# Patient Record
Sex: MALE | Race: WHITE | Employment: UNEMPLOYED | URBAN - METROPOLITAN AREA
[De-identification: names, ages, dates, MRNs, and addresses within clinical notes are randomized per-mention and may not be internally consistent; named-entity substitution may affect disease eponyms.]

---

## 2021-08-14 ENCOUNTER — HOSPITAL ENCOUNTER (EMERGENCY)
Age: 21
Discharge: HOME | End: 2021-08-14
Attending: EMERGENCY MEDICINE
Payer: MEDICAID

## 2021-08-14 VITALS
TEMPERATURE: 97.7 F | BODY MASS INDEX: 19.22 KG/M2 | WEIGHT: 145 LBS | HEART RATE: 55 BPM | OXYGEN SATURATION: 97 % | SYSTOLIC BLOOD PRESSURE: 113 MMHG | DIASTOLIC BLOOD PRESSURE: 68 MMHG | RESPIRATION RATE: 18 BRPM | HEIGHT: 73 IN

## 2021-08-14 DIAGNOSIS — K02.9 PAIN DUE TO DENTAL CARIES: ICD-10-CM

## 2021-08-14 DIAGNOSIS — K08.89 PAIN, DENTAL: Primary | ICD-10-CM

## 2021-08-14 PROCEDURE — 6370000000 HC RX 637 (ALT 250 FOR IP): Performed by: STUDENT IN AN ORGANIZED HEALTH CARE EDUCATION/TRAINING PROGRAM

## 2021-08-14 PROCEDURE — 99283 EMERGENCY DEPT VISIT LOW MDM: CPT

## 2021-08-14 RX ORDER — IBUPROFEN 800 MG/1
800 TABLET ORAL ONCE
Status: COMPLETED | OUTPATIENT
Start: 2021-08-14 | End: 2021-08-14

## 2021-08-14 RX ORDER — PENICILLIN V POTASSIUM 250 MG/1
500 TABLET ORAL ONCE
Status: COMPLETED | OUTPATIENT
Start: 2021-08-14 | End: 2021-08-14

## 2021-08-14 RX ORDER — PENICILLIN V POTASSIUM 500 MG/1
500 TABLET ORAL 4 TIMES DAILY
Qty: 28 TABLET | Refills: 0 | Status: SHIPPED | OUTPATIENT
Start: 2021-08-14 | End: 2021-08-21

## 2021-08-14 RX ORDER — ACETAMINOPHEN 325 MG/1
650 TABLET ORAL ONCE
Status: COMPLETED | OUTPATIENT
Start: 2021-08-14 | End: 2021-08-14

## 2021-08-14 RX ORDER — KETOROLAC TROMETHAMINE 30 MG/ML
30 INJECTION, SOLUTION INTRAMUSCULAR; INTRAVENOUS ONCE
Status: DISCONTINUED | OUTPATIENT
Start: 2021-08-14 | End: 2021-08-14

## 2021-08-14 RX ORDER — IBUPROFEN 800 MG/1
800 TABLET ORAL EVERY 6 HOURS PRN
Qty: 20 TABLET | Refills: 0 | Status: SHIPPED | OUTPATIENT
Start: 2021-08-14 | End: 2021-08-19

## 2021-08-14 RX ADMIN — ACETAMINOPHEN 650 MG: 325 TABLET ORAL at 01:41

## 2021-08-14 RX ADMIN — IBUPROFEN 800 MG: 800 TABLET, FILM COATED ORAL at 01:42

## 2021-08-14 RX ADMIN — BENZOCAINE: 220 GEL, DENTIFRICE DENTAL at 01:42

## 2021-08-14 RX ADMIN — PENICILLIN V POTASSIUM 500 MG: 250 TABLET ORAL at 01:42

## 2021-08-14 ASSESSMENT — ENCOUNTER SYMPTOMS
VOMITING: 0
NAUSEA: 0
BACK PAIN: 0
ABDOMINAL PAIN: 0
SHORTNESS OF BREATH: 0

## 2021-08-14 ASSESSMENT — PAIN DESCRIPTION - LOCATION: LOCATION: TEETH

## 2021-08-14 ASSESSMENT — PAIN DESCRIPTION - ORIENTATION: ORIENTATION: LEFT

## 2021-08-14 ASSESSMENT — PAIN SCALES - GENERAL: PAINLEVEL_OUTOF10: 9

## 2021-08-14 NOTE — ED PROVIDER NOTES
[facility] ED  Emergency Department Encounter  Emergency Medicine Resident     Pt Name: Modesta Herring  MRN: [id number]  [place] 2000  Date of evaluation: 8/14/21  PCP:  No primary care provider on file. CHIEF COMPLAINT       Chief Complaint   Patient presents with    Dental Pain       HISTORY OFPRESENT ILLNESS  (Location/Symptom, Timing/Onset, Context/Setting, Quality, Duration, Modifying Factors,Severity.)      Modesta Herring is a 24 y. o.yo male who presents with dental pain. Patient here with dental pain, has dental infection and tooth decay along with caries, states he has not made a dentist appointment but feels that he needs to. States that the pain is 10 out of 10 in severity located on the left maxillary area of the jaw, denies traumatic injuries denies ear pain but states he does have a left-sided headache. Denies traumatic injury, neck pain or rigidity. Denies voice changes or trouble swallowing or swelling in his throat. Denies shortness of breath. PAST MEDICAL / SURGICAL / SOCIAL / FAMILY HISTORY      has no past medical history on file. has no past surgical history on file.   Past medical history and surgical history reviewed and none found    Social History     Socioeconomic History    Marital status: Single     Spouse name: Not on file    Number of children: Not on file    Years of education: Not on file    Highest education level: Not on file   Occupational History    Not on file   Tobacco Use    Smoking status: Never Smoker    Smokeless tobacco: Never Used   Substance and Sexual Activity    Alcohol use: Not on file    Drug use: Not on file    Sexual activity: Not on file   Other Topics Concern    Not on file   Social History Narrative    Not on file     Social Determinants of Health     Financial Resource Strain:     Difficulty of Paying Living Expenses:    Food Insecurity:     Worried About Running Out of Food in the Last Year:     Chase of Food in the Last Year:    Transportation Needs:     Lack of Transportation (Medical):  Lack of Transportation (Non-Medical):    Physical Activity:     Days of Exercise per Week:     Minutes of Exercise per Session:    Stress:     Feeling of Stress :    Social Connections:     Frequency of Communication with Friends and Family:     Frequency of Social Gatherings with Friends and Family:     Attends Adventism Services:     Active Member of Clubs or Organizations:     Attends Club or Organization Meetings:     Marital Status:    Intimate Partner Violence:     Fear of Current or Ex-Partner:     Emotionally Abused:     Physically Abused:     Sexually Abused:        History reviewed. No pertinent family history. Allergies:  Patient has no known allergies. Home Medications:  Prior to Admission medications    Medication Sig Start Date End Date Taking? Authorizing Provider   penicillin v potassium (VEETID) 500 MG tablet Take 1 tablet by mouth 4 times daily for 7 days 8/14/21 8/21/21 Yes Sejal Fernandez MD   ibuprofen (IBU) 800 MG tablet Take 1 tablet by mouth every 6 hours as needed for Pain 8/14/21 8/19/21 Yes Sejal Fernandez MD       REVIEW OFSYSTEMS    (2-9 systems for level 4, 10 or more for level 5)      Review of Systems   Constitutional: Negative for diaphoresis and fever. HENT: Positive for dental problem. Negative for congestion. Eyes: Negative for visual disturbance. Respiratory: Negative for shortness of breath. Cardiovascular: Negative for chest pain. Gastrointestinal: Negative for abdominal pain, nausea and vomiting. Endocrine: Negative for polyuria. Genitourinary: Negative for dysuria. Musculoskeletal: Negative for back pain. Skin: Negative for wound. Neurological: Negative for headaches. Psychiatric/Behavioral: Negative for confusion.        PHYSICAL EXAM   (up to 7 for level 4, 8 or more forlevel 5)      ED TRIAGE VITALS BP: 113/68, Temp: 97.7 °F (36.5 °C), Pulse: 55, Resp: 16, SpO2: 97 %    Vitals:    08/14/21 0012   BP: 113/68   Pulse: 55   Resp: 16   Temp: 97.7 °F (36.5 °C)   TempSrc: Oral   SpO2: 97%   Weight: 145 lb (65.8 kg)   Height: 6' 1\" (1.854 m)       Physical Exam  HENT:      Head: Normocephalic. Mouth/Throat:      Dentition: Abnormal dentition. Dental tenderness present. Neurological:      Mental Status: He is alert. DIFFERENTIAL  DIAGNOSIS     PLAN (LABS / IMAGING / EKG):  No orders of the defined types were placed in this encounter. MEDICATIONS ORDERED:  Orders Placed This Encounter   Medications    DISCONTD: ketorolac (TORADOL) injection 30 mg    acetaminophen (TYLENOL) tablet 650 mg    benzocaine (LOLLICAINE) 20 % dental swab    penicillin v potassium (VEETID) tablet 500 mg    penicillin v potassium (VEETID) 500 MG tablet     Sig: Take 1 tablet by mouth 4 times daily for 7 days     Dispense:  28 tablet     Refill:  0    ibuprofen (IBU) 800 MG tablet     Sig: Take 1 tablet by mouth every 6 hours as needed for Pain     Dispense:  20 tablet     Refill:  0    ibuprofen (ADVIL;MOTRIN) tablet 800 mg       DDX:   Dental pain    Initial MDM/Plan: 24 y.o. male who presents with dental pain    Dental pain and infection:  Here with left-sided maxillary multiple tooth decay and infection  Dental pain  No visual signs of abscess that can be drained  Antibiotics and pain control  Follow-up with dentist within a week  Return precautions given        DIAGNOSTIC RESULTS / EMERGENCYDEPARTMENT COURSE / MDM     LABS:  No results found for this visit on 08/14/21. RADIOLOGY:  No orders to display           EMERGENCY DEPARTMENT COURSE:  ED Course as of Aug 14 0135   Sat Aug 14, 2021   0133 Patient seen and assessed in the emergency department no acute respiratory cardiovascular distress.   Patient here with dental pain, has dental infection and tooth decay along with caries, states he has not made a dentist appointment but feels that he needs to.  States that the pain is 10 out of 10 in severity located on the left maxillary area of the jaw, denies traumatic injuries denies ear pain but states he does have a left-sided headache. Denies traumatic injury, neck pain or rigidity. Denies voice changes or trouble swallowing or swelling in his throat. Denies shortness of breath. [PS]      ED Course User Index  [PS] Jhon Arceo MD          PROCEDURES:  None    CONSULTS:  None    CRITICAL CARE:  Please see attending note    FINAL IMPRESSION      1. Pain, dental    2.  Pain due to dental caries          DISPOSITION / PLAN     DISPOSITION Discharge - Pending Orders Complete 08/14/2021 01:28:56 AM       PATIENT REFERRED TO:  Jacqui Farnsworth [facility] 76.  [address], [address] [address]  [phone]  Schedule an appointment as soon as possible for a visit   Make an appointment soon as possible    [facility] ED  [address] [place] [address]  [phone]    As needed, If symptoms worsen      DISCHARGE MEDICATIONS:  New Prescriptions    IBUPROFEN (IBU) 800 MG TABLET    Take 1 tablet by mouth every 6 hours as needed for Pain    PENICILLIN V POTASSIUM (VEETID) 500 MG TABLET    Take 1 tablet by mouth 4 times daily for 7 days       Jhon Arceo MD  Emergency Medicine Resident    (Please note that portions of this note were completed with a voice recognition program.Efforts were made to edit the dictations but occasionally words are mis-transcribed.)       Jhon Arceo MD  Resident  08/14/21 8116

## 2021-08-14 NOTE — ED PROVIDER NOTES
[facility] ED     Emergency Department     Faculty Attestation        I performed a history and physical examination of the patient and discussed management with the resident. I reviewed the residents note and agree with the documented findings and plan of care. Any areas of disagreement are noted on the chart. I was personally present for the key portions of any procedures. I have documented in the chart those procedures where I was not present during the key portions. I have reviewed the emergency nurses triage note. I agree with the chief complaint, past medical history, past surgical history, allergies, medications, social and family history as documented unless otherwise noted below. For mid-level providers such as nurse practitioners as well as physicians assistants:    I have personally seen and evaluated the patient. I find the patient's history and physical exam are consistent with NP/PA documentation. I agree with the care provided, treatment rendered, disposition, & follow-up plan. Additional findings are as noted. Vital Signs: /68   Pulse 55   Temp 97.7 °F (36.5 °C) (Oral)   Resp 16   Ht 6' 1\" (1.854 m)   Wt 145 lb (65.8 kg)   SpO2 97%   BMI 19.13 kg/m²   PCP:  No primary care provider on file.     Pertinent Comments:           Critical Care  None          Silvino Butts MD    Attending Emergency Medicine Physician              Madeline Bernstein MD  08/14/21 7395

## 2022-12-12 ENCOUNTER — HOSPITAL ENCOUNTER (EMERGENCY)
Age: 22
Discharge: HOME | End: 2022-12-12
Attending: EMERGENCY MEDICINE
Payer: COMMERCIAL

## 2022-12-12 VITALS
SYSTOLIC BLOOD PRESSURE: 112 MMHG | RESPIRATION RATE: 12 BRPM | HEART RATE: 55 BPM | DIASTOLIC BLOOD PRESSURE: 55 MMHG | OXYGEN SATURATION: 100 % | TEMPERATURE: 97.7 F

## 2022-12-12 DIAGNOSIS — K08.89 PAIN, DENTAL: Primary | ICD-10-CM

## 2022-12-12 PROCEDURE — 6370000000 HC RX 637 (ALT 250 FOR IP): Performed by: STUDENT IN AN ORGANIZED HEALTH CARE EDUCATION/TRAINING PROGRAM

## 2022-12-12 PROCEDURE — 99283 EMERGENCY DEPT VISIT LOW MDM: CPT

## 2022-12-12 RX ORDER — PENICILLIN V POTASSIUM 250 MG/1
500 TABLET ORAL ONCE
Status: COMPLETED | OUTPATIENT
Start: 2022-12-12 | End: 2022-12-12

## 2022-12-12 RX ORDER — PENICILLIN V POTASSIUM 500 MG/1
500 TABLET ORAL 4 TIMES DAILY
Qty: 27 TABLET | Refills: 0 | Status: SHIPPED | OUTPATIENT
Start: 2022-12-12 | End: 2022-12-19

## 2022-12-12 RX ORDER — ACETAMINOPHEN 500 MG
1000 TABLET ORAL ONCE
Status: COMPLETED | OUTPATIENT
Start: 2022-12-12 | End: 2022-12-12

## 2022-12-12 RX ORDER — MIDODRINE HYDROCHLORIDE 5 MG/1
5 TABLET ORAL 2 TIMES DAILY
COMMUNITY

## 2022-12-12 RX ADMIN — ACETAMINOPHEN 1000 MG: 500 TABLET ORAL at 11:42

## 2022-12-12 RX ADMIN — PENICILLIN V POTASSIUM 500 MG: 250 TABLET ORAL at 11:42

## 2022-12-12 ASSESSMENT — ENCOUNTER SYMPTOMS
ABDOMINAL PAIN: 0
SHORTNESS OF BREATH: 0

## 2022-12-12 NOTE — DISCHARGE INSTRUCTIONS
Please call and schedule an appointment with the [address] [place] [address] at [phone] located at [address] [place] [address]. Take your medication as indicated and prescribed. Please take 1 tablet penicillin VK four times daily for 7 days. Drink plenty of water while taking the antibiotics. Avoid drinking alcohol or drinks that have caffeine in it while taking antibiotics. For pain use ibuprofen (Motrin / Advil) or acetaminophen (Tylenol), you can take over the counter acetaminophen tablets (1 - 2 tablets of the 325-mg strength every 6 hours) or ibuprofen tablets (2 tablets every 4 hours). PLEASE RETURN TO THE EMERGENCY DEPARTMENT IMMEDIATELY for worsening symptoms, swelling to your face, redness on your face, drainage from the tooth, or if you develop any concerning symptoms such as: high fever not relieved by acetaminophen (Tylenol) and/or ibuprofen (Motrin / Advil), chills, shortness of breath, chest pain, feeling of your heart fluttering or racing, persistent nausea and/or vomiting, vomiting up blood, blood in your stool, numbness, loss of consciousness, weakness or tingling in the arms or legs or change in color of the extremities, changes in mental status, persistent headache, blurry vision, loss of bladder / bowel control, unable to follow up with your physician, or other any other care or concern.

## 2022-12-12 NOTE — Clinical Note
Jevon [place] was seen and treated in our emergency department on 12/12/2022. He may return to work on 12/14/2022. If you have any questions or concerns, please don't hesitate to call.       Wilbur Bae, DO

## 2022-12-12 NOTE — ED PROVIDER NOTES
[facility]  ED  Emergency Department Encounter  Emergency Medicine Resident     Pt Name: Xavier Don  MRN: [id number]  [place] 2000  Date of evaluation: 12/12/22  PCP:  No primary care provider on file. CHIEF COMPLAINT       Chief Complaint   Patient presents with    Dental Pain     Right upper         HISTORY OFPRESENT ILLNESS  (Location/Symptom, Timing/Onset, Context/Setting, Quality, Duration, Modifying Factors,Severity.)      Xavier Don is a 20 y. o.yo male who presents with right upper dental pain. States started few days ago. Has been taking Motrin at home with no relief. States he has had pain like this previously that required him to have a tooth pulled. States he currently does not have a dentist.  Has not been on any recent antibiotics for it. Denies any fevers. States he is eating and drinking without difficulty. PAST MEDICAL / SURGICAL / SOCIAL / FAMILY HISTORY      has no past medical history on file. has no past surgical history on file. Social History     Socioeconomic History    Marital status: Single     Spouse name: Not on file    Number of children: Not on file    Years of education: Not on file    Highest education level: Not on file   Occupational History    Not on file   Tobacco Use    Smoking status: Never    Smokeless tobacco: Never   Vaping Use    Vaping Use: Every day   Substance and Sexual Activity    Alcohol use: Not Currently     Comment: special occasions    Drug use: Never    Sexual activity: Not on file   Other Topics Concern    Not on file   Social History Narrative    Not on file     Social Determinants of Health     Financial Resource Strain: Not on file   Food Insecurity: Not on file   Transportation Needs: Not on file   Physical Activity: Not on file   Stress: Not on file   Social Connections: Not on file   Intimate Partner Violence: Not on file   Housing Stability: Not on file       History reviewed. No pertinent family history. Allergies:  Patient has no known allergies. Home Medications:  Prior to Admission medications    Medication Sig Start Date End Date Taking? Authorizing Provider   midodrine (PROAMATINE) 5 MG tablet Take 5 mg by mouth in the morning and at bedtime   Yes Historical Provider, MD   penicillin v potassium (VEETID) 500 MG tablet Take 1 tablet by mouth 4 times daily for 7 days 12/12/22 12/19/22 Yes Kumar Get, DO   ibuprofen (IBU) 800 MG tablet Take 1 tablet by mouth every 6 hours as needed for Pain 8/14/21 8/19/21  Phillip Arias MD       REVIEW OFSYSTEMS    (2-9 systems for level 4, 10 or more for level 5)      Review of Systems   Constitutional:  Negative for chills and fever. HENT:  Positive for dental problem. Eyes:  Negative for visual disturbance. Respiratory:  Negative for shortness of breath. Cardiovascular:  Negative for chest pain. Gastrointestinal:  Negative for abdominal pain. Musculoskeletal:  Negative for neck pain and neck stiffness. Skin:  Negative for wound. Neurological:  Negative for headaches. Psychiatric/Behavioral:  Negative for confusion. PHYSICAL EXAM   (up to 7 for level 4, 8 or more forlevel 5)      INITIAL VITALS:   Vitals:    12/12/22 1126   BP: (!) 112/55   Pulse: 55   Resp: 12   Temp: 97.7 °F (36.5 °C)   TempSrc: Oral   SpO2: 100%         Physical Exam  Vitals reviewed. Constitutional:       General: He is not in acute distress. Appearance: Normal appearance. HENT:      Head: Normocephalic and atraumatic. Right Ear: External ear normal.      Left Ear: External ear normal.      Nose: Nose normal.      Mouth/Throat:      Mouth: Mucous membranes are moist.      Pharynx: No oropharyngeal exudate or posterior oropharyngeal erythema. Comments: Poor dentition. Tenderness to palpation over right upper teeth. No obvious abscess. No drooling. Tolerating secretions well. Eyes:      General:         Right eye: No discharge.          Left eye: No discharge. Extraocular Movements: Extraocular movements intact. Cardiovascular:      Rate and Rhythm: Regular rhythm. Bradycardia present. Pulmonary:      Effort: Pulmonary effort is normal. No respiratory distress. Musculoskeletal:      Cervical back: Normal range of motion. Comments: Moving all 4 extremities   Skin:     General: Skin is warm. Capillary Refill: Capillary refill takes less than 2 seconds. Neurological:      General: No focal deficit present. Mental Status: He is alert and oriented to person, place, and time. Cranial Nerves: No cranial nerve deficit. Psychiatric:         Mood and Affect: Mood normal.       DIFFERENTIAL  DIAGNOSIS     PLAN (LABS / IMAGING / EKG):  No orders of the defined types were placed in this encounter. MEDICATIONS ORDERED:  Orders Placed This Encounter   Medications    acetaminophen (TYLENOL) tablet 1,000 mg    penicillin v potassium (VEETID) tablet 500 mg     Order Specific Question:   Antimicrobial Indications     Answer:   Head and Neck Infection    penicillin v potassium (VEETID) 500 MG tablet     Sig: Take 1 tablet by mouth 4 times daily for 7 days     Dispense:  27 tablet     Refill:  0         DDX: Dental pain, dental infection    Initial MDM/Plan: 20 y.o. male who presents with dental pain. Started few days ago. Located in the right upper tooth. Does not currently follow with a dentist.  No fevers, eating and drinking well. Patient appears well initial evaluation, no acute distress, afebrile, stable vital signs. Poor dentition, no obvious abscess. We will start patient on antibiotics, provide pain control, discharged with dental follow-up. We will have social work provide patient with dental resources. Patient in agreement with this. Advise close follow-up with dentist as well as PCP. Given strict return precautions. Patient agreed with plan.      DIAGNOSTIC RESULTS / EMERGENCYDEPARTMENT COURSE / MDM     LABS:  Labs Reviewed - No data to display    PROCEDURES:  None    CONSULTS:  None    CRITICAL CARE:  See attending physician note    FINAL IMPRESSION      1.  Pain, dental          DISPOSITION / PLAN     DISPOSITION Decision To Discharge 12/12/2022 12:05:18 PM      PATIENT REFERRED TO:  [facility] ED  [address] [facility] [address]  [phone]  Go to   If symptoms worsen    Jacqui Farnsworth [facility] 76.  [address], [address] [address]  [phone]  Schedule an appointment as soon as possible for a visit in 1 day      DISCHARGE MEDICATIONS:  Discharge Medication List as of 12/12/2022 12:05 PM        START taking these medications    Details   penicillin v potassium (VEETID) 500 MG tablet Take 1 tablet by mouth 4 times daily for 7 days, Disp-27 tablet, R-0Normal             Amanda Meyer DO  Emergency Medicine Resident    (Please note that portions of this note were completed with a voice recognition program.Efforts were made to edit the dictations but occasionally words are mis-transcribed.)        Fredi Wade DO  Resident  12/12/22 0131

## 2022-12-12 NOTE — ED PROVIDER NOTES
[address] [facility]     Emergency Department     Faculty Attestation    I performed a history and physical examination of the patient and discussed management with the resident. I have reviewed and agree with the residents findings including all diagnostic interpretations, and treatment plans as written. Any areas of disagreement are noted on the chart. I was personally present for the key portions of any procedures. I have documented in the chart those procedures where I was not present during the key portions. I have reviewed the emergency nurses triage note. I agree with the chief complaint, past medical history, past surgical history, allergies, medications, social and family history as documented unless otherwise noted below. Documentation of the HPI, Physical Exam and Medical Decision Making performed by scribzuleyma is based on my personal performance of the HPI, PE and MDM. For Physician Assistant/ Nurse Practitioner cases/documentation I have personally evaluated this patient and have completed at least one if not all key elements of the E/M (history, physical exam, and MDM). Additional findings are as noted.     R maxillary molar pain this morning  No trismus, no obvious abscess, no facial swelling,   ttp to percussion  Handling oral secretions,  Well appearing   Abx, dental follow up, discharge,   Susan Fonseca D.O, M.P.H  Attending Emergency Medicine Physician        Susan Fonseca, DO  12/12/22 1200

## 2022-12-12 NOTE — Clinical Note
Lorena Hooker was seen and treated in our emergency department on 12/12/2022. He may return to work on . If you have any questions or concerns, please don't hesitate to call.       Maritza Push, DO

## 2022-12-12 NOTE — ED NOTES
Writer provided patient dental clinic resources & insurance contact information to source covered dentists. Patient denied additional needs.      STEPHEN Small  12/12/22 6806

## 2023-02-12 ENCOUNTER — HOSPITAL ENCOUNTER (EMERGENCY)
Age: 23
Discharge: HOME | End: 2023-02-12
Attending: EMERGENCY MEDICINE
Payer: COMMERCIAL

## 2023-02-12 VITALS
OXYGEN SATURATION: 100 % | SYSTOLIC BLOOD PRESSURE: 102 MMHG | TEMPERATURE: 98.8 F | WEIGHT: 150 LBS | BODY MASS INDEX: 20.32 KG/M2 | DIASTOLIC BLOOD PRESSURE: 61 MMHG | HEART RATE: 75 BPM | RESPIRATION RATE: 18 BRPM | HEIGHT: 72 IN

## 2023-02-12 DIAGNOSIS — R55 SYNCOPE AND COLLAPSE: Primary | ICD-10-CM

## 2023-02-12 LAB
ABSOLUTE EOS #: 0.24 K/UL (ref 0–0.44)
ABSOLUTE IMMATURE GRANULOCYTE: 0.04 K/UL (ref 0–0.3)
ABSOLUTE LYMPH #: 3.96 K/UL (ref 1.1–3.7)
ABSOLUTE MONO #: 0.75 K/UL (ref 0.1–1.2)
ANION GAP SERPL CALCULATED.3IONS-SCNC: 14 MMOL/L (ref 9–17)
BASOPHILS # BLD: 1 % (ref 0–2)
BASOPHILS ABSOLUTE: 0.09 K/UL (ref 0–0.2)
BUN SERPL-MCNC: 13 MG/DL (ref 6–20)
CALCIUM SERPL-MCNC: 9.8 MG/DL (ref 8.6–10.4)
CHLORIDE SERPL-SCNC: 99 MMOL/L (ref 98–107)
CO2 SERPL-SCNC: 25 MMOL/L (ref 20–31)
CREAT SERPL-MCNC: 0.93 MG/DL (ref 0.7–1.2)
EOSINOPHILS RELATIVE PERCENT: 2 % (ref 1–4)
GFR SERPL CREATININE-BSD FRML MDRD: >60 ML/MIN/1.73M2
GLUCOSE SERPL-MCNC: 136 MG/DL (ref 70–99)
HCT VFR BLD AUTO: 43.1 % (ref 40.7–50.3)
HGB BLD-MCNC: 15 G/DL (ref 13–17)
IMMATURE GRANULOCYTES: 0 %
LYMPHOCYTES # BLD: 40 % (ref 24–43)
MCH RBC QN AUTO: 30.2 PG (ref 25.2–33.5)
MCHC RBC AUTO-ENTMCNC: 34.8 G/DL (ref 28.4–34.8)
MCV RBC AUTO: 86.7 FL (ref 82.6–102.9)
MONOCYTES # BLD: 8 % (ref 3–12)
NRBC AUTOMATED: 0 PER 100 WBC
PDW BLD-RTO: 11.9 % (ref 11.8–14.4)
PLATELET # BLD AUTO: 359 K/UL (ref 138–453)
PMV BLD AUTO: 9.9 FL (ref 8.1–13.5)
POTASSIUM SERPL-SCNC: 3.4 MMOL/L (ref 3.7–5.3)
RBC # BLD: 4.97 M/UL (ref 4.21–5.77)
SEG NEUTROPHILS: 49 % (ref 36–65)
SEGMENTED NEUTROPHILS ABSOLUTE COUNT: 4.73 K/UL (ref 1.5–8.1)
SODIUM SERPL-SCNC: 138 MMOL/L (ref 135–144)
WBC # BLD AUTO: 9.8 K/UL (ref 3.5–11.3)

## 2023-02-12 PROCEDURE — 99284 EMERGENCY DEPT VISIT MOD MDM: CPT

## 2023-02-12 PROCEDURE — 85025 COMPLETE CBC W/AUTO DIFF WBC: CPT

## 2023-02-12 PROCEDURE — 93005 ELECTROCARDIOGRAM TRACING: CPT | Performed by: EMERGENCY MEDICINE

## 2023-02-12 PROCEDURE — 80048 BASIC METABOLIC PNL TOTAL CA: CPT

## 2023-02-12 ASSESSMENT — ENCOUNTER SYMPTOMS
ABDOMINAL PAIN: 0
VOMITING: 0
DIARRHEA: 0
SORE THROAT: 0
COUGH: 0
NAUSEA: 0
CONSTIPATION: 0
RHINORRHEA: 0
SHORTNESS OF BREATH: 0

## 2023-02-12 NOTE — DISCHARGE INSTRUCTIONS
You are seen in the ER today for your syncopal event. We checked basic labs which were all within normal limits. Please continue taking your midodrine as prescribed. Please refrain from taking it with alcohol in the future as this may have contributed to your syncopal event today. Please return to the emergency department if you develop worsening symptoms. PLEASE RETURN TO THE EMERGENCY DEPARTMENT IMMEDIATELY if you develop any concerning symptoms such as: chest pain, shortness of breath, feeling like your heart is racing, high fever not relieved by acetaminophen (Tylenol) and/or ibuprofen (Motrin / Advil), chills, persistent nausea and/or vomiting, loss of consciousness, numbness, weakness or tingling in the arms or legs or change in color of the extremities, changes in mental status, persistent or severe headache, blurry vision, loss of bladder / bowel control, unable to follow up with your physician, or other any other care or concern.

## 2023-02-12 NOTE — ED PROVIDER NOTES
[facility] ED     Emergency Department     Faculty Attestation        I performed a history and physical examination of the patient and discussed management with the resident. I reviewed the residents note and agree with the documented findings and plan of care. Any areas of disagreement are noted on the chart. I was personally present for the key portions of any procedures. I have documented in the chart those procedures where I was not present during the key portions. I have reviewed the emergency nurses triage note. I agree with the chief complaint, past medical history, past surgical history, allergies, medications, social and family history as documented unless otherwise noted below. For Physician Assistant/ Nurse Practitioner cases/documentation I have personally evaluated this patient and have completed at least one if not all key elements of the E/M (history, physical exam, and MDM). Additional findings are as noted. PCP:  No primary care provider on file. Pertinent Comments:     Patient is a 19-year-old male with history of syncope and situational syncope on midodrine. The patient was thinking about the blood that was being drawn on his partner when he suffered a syncopal episode and did have loss of urination function at that time. No postictal period and no actual seizure seen. Heart is regular rate and rhythm slightly tachycardic with no obvious systolic ejection murmur. Lungs clear to station bilateral with midline trachea and abdomen is soft/nontender. No calf tenderness or swelling in the lower extremities either. Patient back to normal at this time.    Assessment/plan: Patient with likely situational syncope        EKG Interpretation    Interpreted by emergency department physician    Rhythm: normal sinus   Rate: normal at 87 bpm  Axis: normal  Conduction: Incomplete right bundle branch block  ST Segments: no acute change  T Waves: no acute change  Q Waves: no acute change    Clinical Impression:  nonspecific EKG and when compared to previous interpretation of the EKG on 7/9/2022 right bundle branch block was interpreted at that time as well      Critical Care  None      (Please note that portions of this note were completed with a voice recognition program. Efforts were made to edit the dictations but occasionally words are mis-transcribed.  Whenever words are used in this note in any gender, they shall be construed as though they were used in the gender appropriate to the circumstances; and whenever words are used in this note in the singular or plural form, they shall be construed as though they were used in the form appropriate to the circumstances.)    MD Papi Mireles  Attending Emergency Medicine Physician            Leonel Arriola MD  02/12/23 4609       Leonel Arriola MD  02/12/23 9295

## 2023-02-12 NOTE — ED NOTES
The following labs labeled with pt sticker and tubed to lab:     [x] Blue     [x] Lavender   [] on ice  [x] Green/yellow  [] Green/black [] on ice  [] Yellow  [] Red  [] Pink      [] COVID-19 swab    [] Rapid  [] PCR  [] Flu Swab  [] Strep Swab  [] Peds Viral Panel     [] Urine Sample  [] Pelvic Cultures  [] Blood Cultures   [] Wound Cultures          Jazmín Ochoa RN  02/12/23 4614

## 2023-02-12 NOTE — ED PROVIDER NOTES
[facility] ED  Emergency Department Encounter  Emergency Medicine Resident     Pt Leisa Shields  MRN: [id number]  [place] 2000  Date of evaluation: 2/12/23  PCP:  No primary care provider on file. Note Started: 12:50 AM EST      CHIEF COMPLAINT       Chief Complaint   Patient presents with    Loss of Consciousness     Witnessed, at the thought of blood in significant others room          HISTORY OF PRESENT ILLNESS  (Location/Symptom, Timing/Onset, Context/Setting, Quality, Duration, Modifying Factors, Severity.)      Cathie Castro is a 24 y.o. male who presents with syncope. Patient significant other is a patient in the ER. Patient has a history of syncope with blood draws and IVs.  When his significant other was having IV placed, the patient stepped out of the room. He states he was then thinking about blood which caused him to get presyncopal.  He walked back into the room and sat down, and syncopized. Patient did lose control of his bladder. Syncopal event witnessed by physician and there was no seizure-like activity. Patient quickly regained consciousness. He denies any current confusion, presyncopal feeling, weakness. Patient states he is on midodrine for neurocardiogenic syncope. He states he drank 2 beers today and then took his midodrine. Prior to this syncopal event, the patient states he was feeling well and not entirely at his baseline. PAST MEDICAL / SURGICAL / SOCIAL / FAMILY HISTORY     History of neurocardiogenic syncope       has no past surgical history on file.       Social History     Socioeconomic History    Marital status: Single     Spouse name: Not on file    Number of children: Not on file    Years of education: Not on file    Highest education level: Not on file   Occupational History    Not on file   Tobacco Use    Smoking status: Never    Smokeless tobacco: Never   Vaping Use    Vaping Use: Every day   Substance and Sexual Activity    Alcohol use: Not Currently     Comment: special occasions    Drug use: Never    Sexual activity: Not on file   Other Topics Concern    Not on file   Social History Narrative    Not on file     Social Determinants of Health     Financial Resource Strain: Not on file   Food Insecurity: Not on file   Transportation Needs: Not on file   Physical Activity: Not on file   Stress: Not on file   Social Connections: Not on file   Intimate Partner Violence: Not on file   Housing Stability: Not on file       No family history on file. Allergies:  Patient has no known allergies. Home Medications:  Prior to Admission medications    Medication Sig Start Date End Date Taking? Authorizing Provider   midodrine (PROAMATINE) 5 MG tablet Take 5 mg by mouth in the morning and at bedtime    Historical Provider, MD   ibuprofen (IBU) 800 MG tablet Take 1 tablet by mouth every 6 hours as needed for Pain 8/14/21 8/19/21  Lona Rolle MD       REVIEW OF SYSTEMS       Review of Systems   Constitutional:  Negative for chills and fever. HENT:  Negative for rhinorrhea and sore throat. Eyes:  Negative for visual disturbance. Respiratory:  Negative for cough and shortness of breath. Cardiovascular:  Negative for chest pain and leg swelling. Gastrointestinal:  Negative for abdominal pain, constipation, diarrhea, nausea and vomiting. Endocrine: Negative for polyuria. Genitourinary:  Negative for dysuria, frequency and hematuria. Incontinence   Musculoskeletal:  Negative for arthralgias, myalgias and neck pain. Skin:  Negative for pallor. Neurological:  Positive for syncope. Negative for numbness and headaches. Psychiatric/Behavioral:  Negative for behavioral problems.       PHYSICAL EXAM      INITIAL VITALS:   /61   Pulse 75   Temp 98.8 °F (37.1 °C) (Oral)   Resp 18   Ht 6' (1.829 m)   Wt 150 lb (68 kg)   SpO2 100%   BMI 20.34 kg/m²     Physical Exam  Constitutional:       General: He is not in acute distress. Appearance: Normal appearance. He is not toxic-appearing. HENT:      Head: Normocephalic and atraumatic. Nose: No congestion or rhinorrhea. Mouth/Throat:      Mouth: Mucous membranes are moist.   Eyes:      Conjunctiva/sclera: Conjunctivae normal.      Pupils: Pupils are equal, round, and reactive to light. Cardiovascular:      Rate and Rhythm: Normal rate and regular rhythm. Pulses: Normal pulses. Heart sounds: No murmur heard. Pulmonary:      Effort: Pulmonary effort is normal. No respiratory distress. Breath sounds: Normal breath sounds. No wheezing. Abdominal:      General: Bowel sounds are normal. There is no distension. Palpations: Abdomen is soft. Tenderness: There is no abdominal tenderness. There is no guarding or rebound. Musculoskeletal:      Cervical back: Normal range of motion. Right lower leg: No edema. Left lower leg: No edema. Skin:     General: Skin is warm and dry. Neurological:      Mental Status: He is alert and oriented to person, place, and time. Psychiatric:         Mood and Affect: Mood normal.         Behavior: Behavior normal.         Judgment: Judgment normal.         DDX/DIAGNOSTIC RESULTS / EMERGENCY DEPARTMENT COURSE / MDM     Medical Decision Making  24 old male with a history of provoked syncope here following a syncopal event. Positive loss of consciousness, did not hit his head. Given the patient's syncopal event was witnessed by attending physician, no concern for seizure. On exam, vital signs within acceptable range. Patient is A&O x4. No focal neurologic deficits. Patient does have some pallor on exam, however is appropriately conversant. Given the patient's history of taking midodrine, will check basic labs including CBC and BMP. Additionally, will check EKG. Anticipate discharge. Amount and/or Complexity of Data Reviewed  Labs: ordered. ECG/medicine tests: ordered.     Critical Care  Total time providing critical care: < 30 minutes      EKG  EKG Interpretation    Interpreted by emergency department physician    Rhythm: normal sinus   Rate: 87  Axis: normal  Ectopy: none  Conduction: right bundle branch block (complete)  ST Segments: no acute change  T Waves: no acute change  Q Waves: none    Clinical Impression: Normal sinus rhythm with right bundle branch block    Jere Hickey DO      All EKG's are interpreted by the Emergency Department Physician who either signs or Co-signs this chart in the absence of a cardiologist.    EMERGENCY DEPARTMENT COURSE:    ED Course as of 02/13/23 2047   Sun Feb 12, 2023   0206 Patient reports he is feeling much better at this time. Patient was able to ambulate without difficulty. We will discharge the patient at this time. Discussed follow-up and return precautions with the patient. Patient verbalized understanding all questions were answered. [BL]      ED Course User Index  [BL] Jere Hickey DO       PROCEDURES:  N/A    CONSULTS:  None    CRITICAL CARE:  There was significant risk of life threatening deterioration of patient's condition requiring my direct management. Critical care time 0 minutes, excluding any documented procedures. FINAL IMPRESSION      1.  Syncope and collapse          DISPOSITION / PLAN     DISPOSITION Decision To Discharge 02/12/2023 02:03:18 AM      PATIENT REFERRED TO:  [address] [place], [place] [address] [phone]    For post-ER visit assessment and establish care    DISCHARGE MEDICATIONS:  Discharge Medication List as of 2/12/2023  2:03 AM          Jere Hickey DO  Emergency Medicine Resident    (Please note that portions of thisnote were completed with a voice recognition program.  Efforts were made to edit the dictations but occasionally words are mis-transcribed.)       Jere Hickey DO  Resident  02/13/23 2047

## 2023-02-12 NOTE — ED PROVIDER NOTES
Faculty Sign-Out Attestation  Handoff taken on the following patient from prior Attending Physician: Aleyda Egan    I was available and discussed any additional care issues that arose and coordinated the management plans with the resident(s) caring for the patient during my duty period. Any areas of disagreement with residents documentation of care or procedures are noted on the chart. I was personally present for the key portions of any/all procedures during my duty period. I have documented in the chart those procedures where I was not present during the key portions.     Vasovagal syncope, on midodrine,   Cbc / bmp, ekg stable,   Will plan to discharge    Bmp stable, vss,    >> discharged per plan     Jerry Olivas, DO  02/12/23 5595 Arnold Carballo, DO  02/12/23 6167

## 2023-02-12 NOTE — ED NOTES
Pt ambulated with standby assistance of another RN. Pt denies dizziness or difficulty with ambulation.       Junie Campbell RN  02/12/23 6814

## 2023-02-12 NOTE — ED NOTES
Patient tolerated walking well. Stated that he felt good enough to be discharged.      Anna Wynn RN  02/12/23 9312

## 2023-02-12 NOTE — ED NOTES
Pt presents to the ED with c/o of syncopal episode. Pt was significant other in room with another patient when pt stepped out because the sight of blood makes patient nauseous. Pt was not in the room at the sight of blood draw but upon entering room patient became flushed and stated he felt like he was going to pass out. Pt then had witnessed syncopal episode in chair by RN and physician and had episode of urine incontinence. Pt awoke moments later and felt lightheaded. Pt moved into ED room and placed on full cardiac monitor, Call light in reach, white board updated.        Jennifer Diego RN  02/12/23 9609

## 2023-02-14 LAB
EKG ATRIAL RATE: 87 BPM
EKG P AXIS: 80 DEGREES
EKG P-R INTERVAL: 162 MS
EKG Q-T INTERVAL: 348 MS
EKG QRS DURATION: 112 MS
EKG QTC CALCULATION (BAZETT): 418 MS
EKG R AXIS: 87 DEGREES
EKG T AXIS: 84 DEGREES
EKG VENTRICULAR RATE: 87 BPM

## 2023-02-14 PROCEDURE — 93010 ELECTROCARDIOGRAM REPORT: CPT | Performed by: INTERNAL MEDICINE

## 2023-05-16 ENCOUNTER — HOSPITAL ENCOUNTER (EMERGENCY)
Age: 23
Discharge: HOME | End: 2023-05-16
Attending: EMERGENCY MEDICINE
Payer: COMMERCIAL

## 2023-05-16 ENCOUNTER — APPOINTMENT (OUTPATIENT)
Dept: GENERAL RADIOLOGY | Age: 23
End: 2023-05-16
Payer: COMMERCIAL

## 2023-05-16 VITALS
HEART RATE: 62 BPM | OXYGEN SATURATION: 100 % | RESPIRATION RATE: 17 BRPM | DIASTOLIC BLOOD PRESSURE: 68 MMHG | TEMPERATURE: 97.2 F | SYSTOLIC BLOOD PRESSURE: 116 MMHG

## 2023-05-16 DIAGNOSIS — M25.562 ACUTE PAIN OF LEFT KNEE: Primary | ICD-10-CM

## 2023-05-16 PROCEDURE — 73552 X-RAY EXAM OF FEMUR 2/>: CPT

## 2023-05-16 PROCEDURE — 6370000000 HC RX 637 (ALT 250 FOR IP): Performed by: EMERGENCY MEDICINE

## 2023-05-16 PROCEDURE — 99283 EMERGENCY DEPT VISIT LOW MDM: CPT

## 2023-05-16 PROCEDURE — 73564 X-RAY EXAM KNEE 4 OR MORE: CPT

## 2023-05-16 RX ORDER — HYDROCODONE BITARTRATE AND ACETAMINOPHEN 5; 325 MG/1; MG/1
1 TABLET ORAL ONCE
Status: COMPLETED | OUTPATIENT
Start: 2023-05-16 | End: 2023-05-16

## 2023-05-16 RX ADMIN — HYDROCODONE BITARTRATE AND ACETAMINOPHEN 1 TABLET: 5; 325 TABLET ORAL at 17:55

## 2023-05-16 ASSESSMENT — PAIN DESCRIPTION - LOCATION: LOCATION: KNEE

## 2023-05-16 ASSESSMENT — PAIN SCALES - GENERAL
PAINLEVEL_OUTOF10: 6
PAINLEVEL_OUTOF10: 6

## 2023-05-16 ASSESSMENT — PAIN - FUNCTIONAL ASSESSMENT: PAIN_FUNCTIONAL_ASSESSMENT: 0-10

## 2023-05-16 NOTE — ED NOTES
Pt respirations are even and unlabored, pt is alert and oriented X 4, speaking in complete sentences, bed is in the lowest position, call light is within reach, NAD noted. Will continue to follow plan of care.         Bryn Castro RN  05/16/23 9155
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
## 2024-07-23 ENCOUNTER — TELEPHONE (OUTPATIENT)
Dept: PRIMARY CARE CLINIC | Age: 24
End: 2024-07-23

## 2024-07-23 NOTE — TELEPHONE ENCOUNTER
Mom called requesting STD testing results that were done at [facility]. Mom advised the pt would need to call [facility] in order to get his results.

## 2024-08-08 ENCOUNTER — OFFICE VISIT (OUTPATIENT)
Dept: PRIMARY CARE CLINIC | Age: 24
End: 2024-08-08
Payer: COMMERCIAL

## 2024-08-08 VITALS
OXYGEN SATURATION: 96 % | SYSTOLIC BLOOD PRESSURE: 103 MMHG | BODY MASS INDEX: 20.54 KG/M2 | DIASTOLIC BLOOD PRESSURE: 69 MMHG | HEART RATE: 77 BPM | WEIGHT: 155 LBS | HEIGHT: 73 IN

## 2024-08-08 DIAGNOSIS — L30.9 HAND DERMATITIS: Primary | ICD-10-CM

## 2024-08-08 PROCEDURE — 1036F TOBACCO NON-USER: CPT | Performed by: NURSE PRACTITIONER

## 2024-08-08 PROCEDURE — 99203 OFFICE O/P NEW LOW 30 MIN: CPT | Performed by: NURSE PRACTITIONER

## 2024-08-08 PROCEDURE — G8427 DOCREV CUR MEDS BY ELIG CLIN: HCPCS | Performed by: NURSE PRACTITIONER

## 2024-08-08 PROCEDURE — G8420 CALC BMI NORM PARAMETERS: HCPCS | Performed by: NURSE PRACTITIONER

## 2024-08-08 RX ORDER — CLOBETASOL PROPIONATE 0.5 MG/G
CREAM TOPICAL
Qty: 60 G | Refills: 0 | Status: SHIPPED | OUTPATIENT
Start: 2024-08-08

## 2024-08-08 RX ORDER — DOXYCYCLINE HYCLATE 100 MG/1
100 CAPSULE ORAL 2 TIMES DAILY
COMMUNITY
Start: 2024-07-26 | End: 2024-08-09

## 2024-08-08 ASSESSMENT — ENCOUNTER SYMPTOMS
SHORTNESS OF BREATH: 0
WHEEZING: 0
CHEST TIGHTNESS: 0
COUGH: 0
RESPIRATORY NEGATIVE: 1

## 2024-08-08 NOTE — PROGRESS NOTES
[facility] PHYSICIANS [facility] IN CARE  [address]  [facility] [address]  Dept: [phone]  Dept Fax: [phone]     Dajuan Arroyo is a 24 y.o. male who presents to the urgent care today for his medicalconditions/complaints as noted below.  Dajuan Arroyo is c/o of Rash (Rash on both hands x 1.5 weeks )    HPI:      Rash  This is a new problem. Episode onset: 1.5 weeks ago. The affected locations include the left hand and right hand. The rash is characterized by itchiness and redness. It is unknown if there was an exposure to a precipitant. Pertinent negatives include no cough, fatigue, fever or shortness of breath. Past treatments include nothing. The treatment provided no relief.       No past medical history on file.        Current Outpatient Medications   Medication Sig Dispense Refill    doxycycline hyclate (VIBRAMYCIN) 100 MG capsule Take 1 capsule by mouth 2 times daily      clobetasol (TEMOVATE) 0.05 % cream Apply topically 2 times daily. 60 g 0    midodrine (PROAMATINE) 5 MG tablet Take 1 tablet by mouth in the morning and at bedtime      ibuprofen (IBU) 800 MG tablet Take 1 tablet by mouth every 6 hours as needed for Pain 20 tablet 0     No current facility-administered medications for this visit.     No Known Allergies    Subjective:      Review of Systems   Constitutional:  Negative for chills, fatigue and fever.   Respiratory: Negative.  Negative for cough, chest tightness, shortness of breath and wheezing.    Cardiovascular: Negative.  Negative for chest pain.   Skin:  Positive for rash (bilateral hands).   All other systems reviewed and are negative.      Objective:      Physical Exam  Vitals and nursing note reviewed.   Constitutional:       General: He is not in acute distress.     Appearance: He is well-developed. He is not diaphoretic.   HENT:      Head: Normocephalic and atraumatic.   Cardiovascular:      Rate and Rhythm: Normal

## 2025-05-27 ENCOUNTER — OFFICE VISIT (OUTPATIENT)
Dept: PRIMARY CARE CLINIC | Age: 25
End: 2025-05-27
Payer: COMMERCIAL

## 2025-05-27 VITALS
TEMPERATURE: 98.5 F | OXYGEN SATURATION: 97 % | SYSTOLIC BLOOD PRESSURE: 115 MMHG | DIASTOLIC BLOOD PRESSURE: 75 MMHG | HEART RATE: 63 BPM

## 2025-05-27 DIAGNOSIS — L25.5 CONTACT DERMATITIS DUE TO PLANT: Primary | ICD-10-CM

## 2025-05-27 PROCEDURE — G8427 DOCREV CUR MEDS BY ELIG CLIN: HCPCS

## 2025-05-27 PROCEDURE — 1036F TOBACCO NON-USER: CPT

## 2025-05-27 PROCEDURE — 99213 OFFICE O/P EST LOW 20 MIN: CPT

## 2025-05-27 PROCEDURE — G8420 CALC BMI NORM PARAMETERS: HCPCS

## 2025-05-27 RX ORDER — TRIAMCINOLONE ACETONIDE 40 MG/ML
40 INJECTION, SUSPENSION INTRA-ARTICULAR; INTRAMUSCULAR ONCE
Status: COMPLETED | OUTPATIENT
Start: 2025-05-27 | End: 2025-05-27

## 2025-05-27 RX ORDER — FLUTICASONE PROPIONATE 50 MCG
1 SPRAY, SUSPENSION (ML) NASAL DAILY
COMMUNITY
Start: 2025-04-28

## 2025-05-27 RX ORDER — CLOBETASOL PROPIONATE 0.5 MG/G
CREAM TOPICAL
Qty: 60 G | Refills: 0 | Status: SHIPPED | OUTPATIENT
Start: 2025-05-27 | End: 2025-06-03

## 2025-05-27 RX ORDER — CETIRIZINE HYDROCHLORIDE 10 MG/1
10 TABLET ORAL DAILY
COMMUNITY
Start: 2025-05-13

## 2025-05-27 RX ADMIN — TRIAMCINOLONE ACETONIDE 40 MG: 40 INJECTION, SUSPENSION INTRA-ARTICULAR; INTRAMUSCULAR at 17:50

## 2025-05-27 ASSESSMENT — ENCOUNTER SYMPTOMS
NAIL CHANGES: 0
SORE THROAT: 0
DIARRHEA: 0
SHORTNESS OF BREATH: 0
RHINORRHEA: 0
COUGH: 0
EYE PAIN: 0
VOMITING: 0
COLOR CHANGE: 1

## 2025-05-27 NOTE — PROGRESS NOTES
[facility] PHYSICIANS [facility] WALK IN CARE  [address]  [facility] [address]  Dept: [phone]  Dept Fax: [phone]    Dajuan Arroyo is a 25 y.o. male who presents to the urgent care today for his medical conditions/complaints as notedbelow.  Dajuan Arroyo is c/o of Rash (Rash rt side of back itches has rash 1 week )      HPI:     Patient presents to the Walk In Clinic for evaluation of a rash, onset 1 week. Patient reports working outside frequently, unsure of exact trigger.     No care team member to display      Rash  This is a new problem. The current episode started in the past 7 days. The problem has been gradually worsening since onset. Location: right side of back. The rash is characterized by redness, swelling and itchiness. He was exposed to nothing. Pertinent negatives include no congestion, cough, diarrhea, eye pain, fatigue, fever, joint pain, nail changes, rhinorrhea, shortness of breath, sore throat or vomiting. Past treatments include topical steroids. The treatment provided no relief.       History reviewed. No pertinent past medical history.     Current Outpatient Medications   Medication Sig Dispense Refill    cetirizine (ZYRTEC) 10 MG tablet Take 1 tablet by mouth daily      fluticasone (FLONASE) 50 MCG/ACT nasal spray 1 spray by Nasal route daily      clobetasol (TEMOVATE) 0.05 % cream Apply topically 2 times daily. 60 g 0    midodrine (PROAMATINE) 5 MG tablet Take 1 tablet by mouth in the morning and at bedtime      ibuprofen (IBU) 800 MG tablet Take 1 tablet by mouth every 6 hours as needed for Pain 20 tablet 0     No current facility-administered medications for this visit.     No Known Allergies    Subjective:      Review of Systems   Constitutional:  Negative for activity change, appetite change, fatigue and fever.   HENT:  Negative for congestion, rhinorrhea and sore throat.    Eyes:  Negative for pain.   Respiratory:  Negative